# Patient Record
Sex: FEMALE | Race: BLACK OR AFRICAN AMERICAN
[De-identification: names, ages, dates, MRNs, and addresses within clinical notes are randomized per-mention and may not be internally consistent; named-entity substitution may affect disease eponyms.]

---

## 2020-12-20 ENCOUNTER — ERX REFILL RESPONSE (OUTPATIENT)
Age: 85
End: 2020-12-20

## 2020-12-20 RX ORDER — PANTOPRAZOLE SODIUM 40 MG/1
1 TABLET TABLET, DELAYED RELEASE ORAL ONCE A DAY
Qty: 90 TABLET | Refills: 2

## 2022-09-13 ENCOUNTER — LAB OUTSIDE AN ENCOUNTER (OUTPATIENT)
Dept: URBAN - METROPOLITAN AREA CLINIC 40 | Facility: CLINIC | Age: 87
End: 2022-09-13

## 2022-09-13 ENCOUNTER — WEB ENCOUNTER (OUTPATIENT)
Dept: URBAN - METROPOLITAN AREA CLINIC 40 | Facility: CLINIC | Age: 87
End: 2022-09-13

## 2022-09-13 ENCOUNTER — OFFICE VISIT (OUTPATIENT)
Dept: URBAN - METROPOLITAN AREA CLINIC 40 | Facility: CLINIC | Age: 87
End: 2022-09-13
Payer: COMMERCIAL

## 2022-09-13 VITALS
HEIGHT: 60 IN | WEIGHT: 139 LBS | TEMPERATURE: 97.5 F | DIASTOLIC BLOOD PRESSURE: 80 MMHG | BODY MASS INDEX: 27.29 KG/M2 | HEART RATE: 68 BPM | SYSTOLIC BLOOD PRESSURE: 132 MMHG

## 2022-09-13 DIAGNOSIS — K59.04 CHRONIC IDIOPATHIC CONSTIPATION: ICD-10-CM

## 2022-09-13 DIAGNOSIS — R10.84 GENERALIZED ABDOMINAL PAIN: ICD-10-CM

## 2022-09-13 DIAGNOSIS — R14.0 BLOATING: ICD-10-CM

## 2022-09-13 DIAGNOSIS — K22.2 SCHATZKI'S RING: ICD-10-CM

## 2022-09-13 PROCEDURE — 99204 OFFICE O/P NEW MOD 45 MIN: CPT | Performed by: INTERNAL MEDICINE

## 2022-09-13 RX ORDER — HYDROCHLOROTHIAZIDE 25 MG/1
AS DIRECTED TABLET ORAL
Status: ACTIVE | COMMUNITY

## 2022-09-13 RX ORDER — LINACLOTIDE 145 UG/1
1 CAPSULE AT LEAST 30 MINUTES BEFORE THE FIRST MEAL OF THE DAY ON AN EMPTY STOMACH CAPSULE, GELATIN COATED ORAL ONCE A DAY
Qty: 30 TABLET | Refills: 3 | OUTPATIENT
Start: 2022-09-13 | End: 2023-01-10

## 2022-09-13 RX ORDER — ROSUVASTATIN CALCIUM 10 MG/1
AS DIRECTED TABLET, FILM COATED ORAL
Status: ACTIVE | COMMUNITY

## 2022-09-13 RX ORDER — NEBIVOLOL 10 MG/1
AS DIRECTED TABLET ORAL
Status: ACTIVE | COMMUNITY

## 2022-09-13 RX ORDER — LISINOPRIL 5 MG/1
AS DIRECTED TABLET ORAL
Status: ACTIVE | COMMUNITY

## 2022-09-13 RX ORDER — PANTOPRAZOLE SODIUM 40 MG/1
1 TABLET TABLET, DELAYED RELEASE ORAL ONCE A DAY
Qty: 90 TABLET | Refills: 2 | Status: DISCONTINUED | COMMUNITY

## 2022-09-13 NOTE — HPI-TODAY'S VISIT:
87 yo CF here with c/o abdominal bloating/cramping. Has chronic constipation. uptodate on colonoscopy. Fairly good health for her age. h/o GERD/schatzki's ring. no dysphagia now

## 2022-09-13 NOTE — PHYSICAL EXAM GASTROINTESTINAL
Abdomen , soft, TTP diffusely nondistended , no guarding or rigidity , no masses palpable , normal bowel sounds , Liver and Spleen , no hepatomegaly present , no hepatosplenomegaly , liver nontender , spleen not palpable

## 2022-09-16 ENCOUNTER — OFFICE VISIT (OUTPATIENT)
Dept: URBAN - METROPOLITAN AREA CLINIC 39 | Facility: CLINIC | Age: 87
End: 2022-09-16

## 2022-09-30 ENCOUNTER — CLAIMS CREATED FROM THE CLAIM WINDOW (OUTPATIENT)
Dept: URBAN - METROPOLITAN AREA CLINIC 39 | Facility: CLINIC | Age: 87
End: 2022-09-30

## 2022-09-30 ENCOUNTER — CLAIMS CREATED FROM THE CLAIM WINDOW (OUTPATIENT)
Dept: URBAN - METROPOLITAN AREA CLINIC 39 | Facility: CLINIC | Age: 87
End: 2022-09-30
Payer: COMMERCIAL

## 2022-09-30 ENCOUNTER — OFFICE VISIT (OUTPATIENT)
Dept: URBAN - METROPOLITAN AREA CLINIC 39 | Facility: CLINIC | Age: 87
End: 2022-09-30

## 2022-09-30 DIAGNOSIS — R14.0 BLOATING: ICD-10-CM

## 2022-09-30 DIAGNOSIS — I70.0 ABDOMINAL AORTIC ATHEROSCLEROSIS: ICD-10-CM

## 2022-09-30 PROCEDURE — 76700 US EXAM ABDOM COMPLETE: CPT | Performed by: INTERNAL MEDICINE

## 2022-09-30 RX ORDER — LISINOPRIL 5 MG/1
AS DIRECTED TABLET ORAL
Status: ACTIVE | COMMUNITY

## 2022-09-30 RX ORDER — NEBIVOLOL 10 MG/1
AS DIRECTED TABLET ORAL
Status: ACTIVE | COMMUNITY

## 2022-09-30 RX ORDER — ROSUVASTATIN CALCIUM 10 MG/1
AS DIRECTED TABLET, FILM COATED ORAL
Status: ACTIVE | COMMUNITY

## 2022-09-30 RX ORDER — HYDROCHLOROTHIAZIDE 25 MG/1
AS DIRECTED TABLET ORAL
Status: ACTIVE | COMMUNITY

## 2022-09-30 RX ORDER — LINACLOTIDE 145 UG/1
1 CAPSULE AT LEAST 30 MINUTES BEFORE THE FIRST MEAL OF THE DAY ON AN EMPTY STOMACH CAPSULE, GELATIN COATED ORAL ONCE A DAY
Qty: 30 TABLET | Refills: 3 | Status: ACTIVE | COMMUNITY
Start: 2022-09-13 | End: 2023-01-10

## 2022-10-11 PROBLEM — 233955003 ABDOMINAL AORTIC ATHEROSCLEROSIS: Status: ACTIVE | Noted: 2022-10-11

## 2022-10-14 ENCOUNTER — TELEPHONE ENCOUNTER (OUTPATIENT)
Dept: URBAN - METROPOLITAN AREA CLINIC 40 | Facility: CLINIC | Age: 87
End: 2022-10-14

## 2022-10-14 ENCOUNTER — WEB ENCOUNTER (OUTPATIENT)
Dept: URBAN - METROPOLITAN AREA CLINIC 40 | Facility: CLINIC | Age: 87
End: 2022-10-14

## 2022-10-14 ENCOUNTER — OFFICE VISIT (OUTPATIENT)
Dept: URBAN - METROPOLITAN AREA CLINIC 40 | Facility: CLINIC | Age: 87
End: 2022-10-14
Payer: COMMERCIAL

## 2022-10-14 VITALS
HEIGHT: 60 IN | WEIGHT: 139 LBS | HEART RATE: 66 BPM | SYSTOLIC BLOOD PRESSURE: 150 MMHG | TEMPERATURE: 97.5 F | BODY MASS INDEX: 27.29 KG/M2 | DIASTOLIC BLOOD PRESSURE: 84 MMHG

## 2022-10-14 DIAGNOSIS — K22.2 SCHATZKI'S RING: ICD-10-CM

## 2022-10-14 DIAGNOSIS — R10.84 GENERALIZED ABDOMINAL PAIN: ICD-10-CM

## 2022-10-14 DIAGNOSIS — R14.0 BLOATING: ICD-10-CM

## 2022-10-14 DIAGNOSIS — K59.09 CHANGE IN BOWEL MOVEMENTS INTERMITTENT CONSTIPATION. URGENCY IN THE MORNING.: ICD-10-CM

## 2022-10-14 PROCEDURE — 99213 OFFICE O/P EST LOW 20 MIN: CPT | Performed by: PHYSICIAN ASSISTANT

## 2022-10-14 RX ORDER — LINACLOTIDE 145 UG/1
1 CAPSULE AT LEAST 30 MINUTES BEFORE THE FIRST MEAL OF THE DAY ON AN EMPTY STOMACH CAPSULE, GELATIN COATED ORAL ONCE A DAY
OUTPATIENT
Start: 2022-09-13

## 2022-10-14 RX ORDER — LISINOPRIL 5 MG/1
AS DIRECTED TABLET ORAL
Status: ACTIVE | COMMUNITY

## 2022-10-14 RX ORDER — ROSUVASTATIN CALCIUM 10 MG/1
AS DIRECTED TABLET, FILM COATED ORAL
Status: ACTIVE | COMMUNITY

## 2022-10-14 RX ORDER — HYDROCHLOROTHIAZIDE 25 MG/1
AS DIRECTED TABLET ORAL
Status: ACTIVE | COMMUNITY

## 2022-10-14 RX ORDER — NEBIVOLOL 10 MG/1
AS DIRECTED TABLET ORAL
Status: ACTIVE | COMMUNITY

## 2022-10-14 RX ORDER — LINACLOTIDE 145 UG/1
1 CAPSULE AT LEAST 30 MINUTES BEFORE THE FIRST MEAL OF THE DAY ON AN EMPTY STOMACH CAPSULE, GELATIN COATED ORAL ONCE A DAY
Qty: 30 TABLET | Refills: 3 | Status: ACTIVE | COMMUNITY
Start: 2022-09-13 | End: 2023-01-10

## 2022-10-14 NOTE — HPI-TODAY'S VISIT:
Her recent complete abdominal ultrasound notable for abdominal aortic plaque, mild.  No evidence of aneurysm.  She admits that her abdominal discomfort is overall improved and has tried to pay attention to her diet and foods that may trigger her symptoms of discomfort, bloating and constipation.  She has noted that bananas and certain beans cause her a problem but she is able to tolerate most fruits and other green vegetables.  No complaint of rectal bleeding, melena.  No nausea, vomiting or dysphagia at this time.  Good appetite.

## 2022-10-14 NOTE — HPI-TODAY'S VISIT:
87 yo CF here with c/o abdominal bloating/cramping. Has chronic constipation. uptodate on colonoscopy. Fairly good health for her age. h/o GERD/schatzki's ring.

## 2023-02-14 ENCOUNTER — WEB ENCOUNTER (OUTPATIENT)
Dept: URBAN - METROPOLITAN AREA CLINIC 40 | Facility: CLINIC | Age: 88
End: 2023-02-14

## 2023-02-17 ENCOUNTER — OFFICE VISIT (OUTPATIENT)
Dept: URBAN - METROPOLITAN AREA CLINIC 40 | Facility: CLINIC | Age: 88
End: 2023-02-17

## 2023-02-17 ENCOUNTER — OFFICE VISIT (OUTPATIENT)
Dept: URBAN - METROPOLITAN AREA CLINIC 40 | Facility: CLINIC | Age: 88
End: 2023-02-17
Payer: COMMERCIAL

## 2023-02-17 VITALS
HEART RATE: 73 BPM | SYSTOLIC BLOOD PRESSURE: 160 MMHG | WEIGHT: 133 LBS | HEIGHT: 60 IN | DIASTOLIC BLOOD PRESSURE: 74 MMHG | BODY MASS INDEX: 26.11 KG/M2

## 2023-02-17 DIAGNOSIS — K59.04 CHRONIC IDIOPATHIC CONSTIPATION: ICD-10-CM

## 2023-02-17 DIAGNOSIS — K22.2 SCHATZKI'S RING: ICD-10-CM

## 2023-02-17 DIAGNOSIS — R10.84 GENERALIZED ABDOMINAL PAIN: ICD-10-CM

## 2023-02-17 PROBLEM — 116289008: Status: ACTIVE | Noted: 2022-09-13

## 2023-02-17 PROBLEM — 235623002: Status: ACTIVE | Noted: 2022-09-13

## 2023-02-17 PROBLEM — 102614006: Status: ACTIVE | Noted: 2022-09-13

## 2023-02-17 PROBLEM — 82934008: Status: ACTIVE | Noted: 2022-09-13

## 2023-02-17 PROCEDURE — 99213 OFFICE O/P EST LOW 20 MIN: CPT | Performed by: PHYSICIAN ASSISTANT

## 2023-02-17 RX ORDER — HYDROCHLOROTHIAZIDE 25 MG/1
AS DIRECTED TABLET ORAL
Status: ACTIVE | COMMUNITY

## 2023-02-17 RX ORDER — LINACLOTIDE 145 UG/1
1 CAPSULE AT LEAST 30 MINUTES BEFORE THE FIRST MEAL OF THE DAY ON AN EMPTY STOMACH CAPSULE, GELATIN COATED ORAL ONCE A DAY
Status: ACTIVE | COMMUNITY
Start: 2022-09-13

## 2023-02-17 RX ORDER — LISINOPRIL 30 MG/1
1 TABLET TABLET ORAL ONCE A DAY
Status: ACTIVE | COMMUNITY

## 2023-02-17 RX ORDER — LINACLOTIDE 145 UG/1
1 CAPSULE AT LEAST 30 MINUTES BEFORE THE FIRST MEAL OF THE DAY ON AN EMPTY STOMACH CAPSULE, GELATIN COATED ORAL ONCE A DAY
Qty: 30 CAPSULE | Refills: 5 | OUTPATIENT

## 2023-02-17 RX ORDER — NEBIVOLOL 10 MG/1
AS DIRECTED TABLET ORAL
Status: ACTIVE | COMMUNITY

## 2023-02-17 RX ORDER — ROSUVASTATIN CALCIUM 10 MG/1
AS DIRECTED TABLET, FILM COATED ORAL
Status: ACTIVE | COMMUNITY

## 2023-02-17 NOTE — HPI-TODAY'S VISIT:
Ms. Anderson is a 87 year old female with c/o abdominal bloating/cramping. Has chronic constipation. uptodate on colonoscopy with normal exam in 2019. Fairly good health for her age. h/o GERD/schatzki's ring found on EGD in 2017. Her recent complete abdominal ultrasound notable for abdominal aortic plaque, mild.  No evidence of aneurysm.  She admits that her abdominal discomfort is overall improved and has tried to pay attention to her diet and foods that may trigger her symptoms of discomfort, bloating and constipation.  She has noted that bananas and certain beans cause her a problem but she is able to tolerate most fruits and other green vegetables.  No complaint of rectal bleeding, melena.  No nausea, vomiting or dysphagia at this time.  Good appetite. She admits to doing well from a gastrointestinal standpoint, requesting refills on her Linzess 145 mcg which she now takes every other day.  She is eating more fiber and drinking more water and believes this helps.  Denies any rectal bleeding.  She did follow-up with cardiologist who is watching her diastolic blood pressure but states that she is otherwise doing well from a cardiovascular standpoint.

## 2023-08-17 ENCOUNTER — OFFICE VISIT (OUTPATIENT)
Dept: URBAN - METROPOLITAN AREA CLINIC 40 | Facility: CLINIC | Age: 88
End: 2023-08-17

## 2023-10-12 ENCOUNTER — OFFICE VISIT (OUTPATIENT)
Dept: URBAN - METROPOLITAN AREA CLINIC 40 | Facility: CLINIC | Age: 88
End: 2023-10-12
Payer: COMMERCIAL

## 2023-10-12 VITALS
HEIGHT: 60 IN | SYSTOLIC BLOOD PRESSURE: 174 MMHG | TEMPERATURE: 99 F | BODY MASS INDEX: 26.7 KG/M2 | DIASTOLIC BLOOD PRESSURE: 82 MMHG | WEIGHT: 136 LBS | HEART RATE: 75 BPM

## 2023-10-12 DIAGNOSIS — K30 INDIGESTION: ICD-10-CM

## 2023-10-12 DIAGNOSIS — K22.2 SCHATZKI'S RING: ICD-10-CM

## 2023-10-12 DIAGNOSIS — R07.9 CHEST PAIN AT REST: ICD-10-CM

## 2023-10-12 DIAGNOSIS — R10.9 CHRONIC ABDOMINAL PAIN: ICD-10-CM

## 2023-10-12 PROCEDURE — 99214 OFFICE O/P EST MOD 30 MIN: CPT | Performed by: PHYSICIAN ASSISTANT

## 2023-10-12 RX ORDER — LINACLOTIDE 145 UG/1
1 CAPSULE AT LEAST 30 MINUTES BEFORE THE FIRST MEAL OF THE DAY ON AN EMPTY STOMACH CAPSULE, GELATIN COATED ORAL ONCE A DAY
Qty: 30 CAPSULE | Refills: 5 | Status: ACTIVE | COMMUNITY

## 2023-10-12 RX ORDER — HYDROCHLOROTHIAZIDE 25 MG/1
AS DIRECTED TABLET ORAL
Status: ACTIVE | COMMUNITY

## 2023-10-12 RX ORDER — FAMOTIDINE 40 MG/1
1 TABLET AT BEDTIME TABLET, FILM COATED ORAL ONCE A DAY
Qty: 30 TABLET | Refills: 1 | OUTPATIENT
Start: 2023-10-12

## 2023-10-12 RX ORDER — LISINOPRIL 30 MG/1
1 TABLET TABLET ORAL ONCE A DAY
Status: ACTIVE | COMMUNITY

## 2023-10-12 RX ORDER — ESCITALOPRAM OXALATE 5 MG/1
1 TABLET TABLET ORAL ONCE A DAY
Status: ACTIVE | COMMUNITY

## 2023-10-12 RX ORDER — ROSUVASTATIN CALCIUM 10 MG/1
AS DIRECTED TABLET, FILM COATED ORAL
Status: ACTIVE | COMMUNITY

## 2023-10-12 NOTE — HPI-TODAY'S VISIT:
Ms. Anderson is a 87 year old female , with history of abdominal bloating/cramping. Has chronic constipation. Last seen 2/17/23, and  uptodate on colonoscopy with normal exam in 2019. Fairly good health for her age. h/o GERD/schatzki's ring found on EGD in 2017. Her recent complete abdominal ultrasound notable for abdominal aortic plaque, mild.  No evidence of aneurysm.  She admits that her abdominal discomfort is overall improved and has tried to pay attention to her diet and foods that may trigger her symptoms of discomfort, bloating and constipation.  She has noted that bananas and certain beans cause her a problem but she is able to tolerate most fruits and other green vegetables.  No complaint of rectal bleeding, melena.  No nausea, vomiting or dysphagia at this time.  Good appetite. She returns to the office today reporting that for 4 months, she has had right and left chest discomfort which is nonradiating, intermittent. No current shortness of breath. Denies orthopnea. States that she has no nausea, vomiting or dysphagia but some indigestion primarily at nighttime. She is not taking anything for this. She now believes even every other day dosing of Linzess 145 mcg is too strong. Otherwise bowel habits unchanged, no rectal bleeding. Denies any changes in medications. States that recently, then later states about 2 months ago, she had presyncopal episode as well as some dizziness, a fall at home when her right knee buckled trying to get up out of bed and rush. No falls in the last several weeks. Apparently, she has been seen by [facility] cardiology in the past but states that she does not recall seeing a cardiovascular provider. She also tells me that she recently told her primary medical provider that she had chest discomfort but the "tests were all normal". Family with patient during visit today. They seem to believe that the patient is only recently started having the symptoms in the last week or so. Patient however, maintains that she has had symptoms ongoing for at least 2 to 4 months. Appetite preserved and weight fairly stable.

## 2023-11-30 ENCOUNTER — DASHBOARD ENCOUNTERS (OUTPATIENT)
Age: 88
End: 2023-11-30

## 2023-11-30 ENCOUNTER — OFFICE VISIT (OUTPATIENT)
Dept: URBAN - METROPOLITAN AREA CLINIC 40 | Facility: CLINIC | Age: 88
End: 2023-11-30
Payer: COMMERCIAL

## 2023-11-30 VITALS
HEART RATE: 62 BPM | DIASTOLIC BLOOD PRESSURE: 88 MMHG | WEIGHT: 127 LBS | BODY MASS INDEX: 24.94 KG/M2 | TEMPERATURE: 97.3 F | SYSTOLIC BLOOD PRESSURE: 152 MMHG | HEIGHT: 60 IN

## 2023-11-30 DIAGNOSIS — K22.2 SCHATZKI'S RING: ICD-10-CM

## 2023-11-30 DIAGNOSIS — K59.04 CHRONIC IDIOPATHIC CONSTIPATION: ICD-10-CM

## 2023-11-30 DIAGNOSIS — R10.9 CHRONIC ABDOMINAL PAIN: ICD-10-CM

## 2023-11-30 DIAGNOSIS — K59.01 CONSTIPATION: ICD-10-CM

## 2023-11-30 DIAGNOSIS — R41.3 MEMORY CHANGES: ICD-10-CM

## 2023-11-30 DIAGNOSIS — K30 INDIGESTION: ICD-10-CM

## 2023-11-30 PROBLEM — 386807006: Status: ACTIVE | Noted: 2023-11-30

## 2023-11-30 PROCEDURE — 99214 OFFICE O/P EST MOD 30 MIN: CPT | Performed by: PHYSICIAN ASSISTANT

## 2023-11-30 RX ORDER — FAMOTIDINE 40 MG/1
1 TABLET AT BEDTIME TABLET, FILM COATED ORAL ONCE A DAY
Qty: 30 TABLET | Refills: 5
Start: 2023-10-12

## 2023-11-30 RX ORDER — ROSUVASTATIN CALCIUM 10 MG/1
AS DIRECTED TABLET, FILM COATED ORAL
Status: ACTIVE | COMMUNITY

## 2023-11-30 RX ORDER — FAMOTIDINE 40 MG/1
1 TABLET AT BEDTIME TABLET, FILM COATED ORAL ONCE A DAY
Qty: 30 TABLET | Refills: 1 | Status: ACTIVE | COMMUNITY
Start: 2023-10-12

## 2023-11-30 RX ORDER — LINACLOTIDE 145 UG/1
1 CAPSULE AT LEAST 30 MINUTES BEFORE THE FIRST MEAL OF THE DAY ON AN EMPTY STOMACH CAPSULE, GELATIN COATED ORAL ONCE A DAY
Qty: 30 CAPSULE | Refills: 5 | Status: ACTIVE | COMMUNITY

## 2023-11-30 RX ORDER — HYDROCHLOROTHIAZIDE 25 MG/1
AS DIRECTED TABLET ORAL
Status: ACTIVE | COMMUNITY

## 2023-11-30 RX ORDER — LISINOPRIL 30 MG/1
1 TABLET TABLET ORAL ONCE A DAY
Status: ACTIVE | COMMUNITY

## 2023-11-30 RX ORDER — ESCITALOPRAM OXALATE 5 MG/1
1 TABLET TABLET ORAL ONCE A DAY
Status: ACTIVE | COMMUNITY

## 2023-11-30 RX ORDER — LINACLOTIDE 72 UG/1
1 CAPSULE AT LEAST 30 MINUTES BEFORE THE FIRST MEAL OF THE DAY ON AN EMPTY STOMACH CAPSULE, GELATIN COATED ORAL ONCE A DAY
Qty: 30 CAPSULE | Refills: 5
End: 2024-05-28

## 2023-11-30 NOTE — HPI-TODAY'S VISIT:
Ms. Anderson is a 87 year old female , with history of abdominal bloating/cramping. Has chronic constipation. Last seen 10/12/23, and  uptodate on colonoscopy with normal exam in 2019. Fairly good health for her age with some memory loss. She has h/o GERD/schatzki's ring found on EGD in 2017. Her recent complete abdominal ultrasound notable for abdominal aortic plaque, mild.  No evidence of aneurysm.  She admits that her abdominal discomfort is overall improved and has tried to pay attention to her diet and foods that may trigger her symptoms of discomfort, bloating and constipation.  She has noted that bananas and certain beans cause her a problem but she is able to tolerate most fruits and other green vegetables.  No complaint of rectal bleeding, melena.  No nausea, vomiting or dysphagia at this time.  Good appetite. States that she has no nausea, vomiting or dysphagia but some indigestion primarily at nighttime. Chest discomfort improved, falling prior fall. Evaluated by CVM. Appetite preserved and weight fairly stable. She is feeling okay today.  States that her chest pain has improved.  She was seen by cardiovascular medicine who did believe that her pain was musculoskeletal in origin though she did have hypertension.  She remains hypertensive and did have hypertensive urgency recently on the same day as her cardiovascular visit.  She was seen in ER and diagnosed with UTI, given antibiotics to treat this.  It was believed that the pain was causing her hypertension.  She has no complaints today of nausea, vomiting, fever or chills.  Good bowel habits on the Linzess but is concerned about taking this long-term and concerned about potential for diarrhea.  She does like the low-dose of 72 mcg.  She is also wondering if she should continue Pepcid which seems to help with heartburn.  She is taking this at night.

## 2024-05-21 ENCOUNTER — OFFICE VISIT (OUTPATIENT)
Dept: URBAN - METROPOLITAN AREA CLINIC 40 | Facility: CLINIC | Age: 89
End: 2024-05-21

## 2024-06-11 ENCOUNTER — OFFICE VISIT (OUTPATIENT)
Dept: URBAN - METROPOLITAN AREA CLINIC 40 | Facility: CLINIC | Age: 89
End: 2024-06-11
Payer: MEDICARE

## 2024-06-11 VITALS
HEIGHT: 60 IN | HEART RATE: 69 BPM | TEMPERATURE: 98 F | BODY MASS INDEX: 26.31 KG/M2 | WEIGHT: 134 LBS | DIASTOLIC BLOOD PRESSURE: 72 MMHG | SYSTOLIC BLOOD PRESSURE: 150 MMHG

## 2024-06-11 DIAGNOSIS — K22.2 SCHATZKI'S RING: ICD-10-CM

## 2024-06-11 DIAGNOSIS — K59.04 CHRONIC IDIOPATHIC CONSTIPATION: ICD-10-CM

## 2024-06-11 DIAGNOSIS — R41.3 MEMORY CHANGES: ICD-10-CM

## 2024-06-11 DIAGNOSIS — K29.60 ADENOPAPILLOMATOSIS GASTRICA: ICD-10-CM

## 2024-06-11 PROCEDURE — 99214 OFFICE O/P EST MOD 30 MIN: CPT | Performed by: PHYSICIAN ASSISTANT

## 2024-06-11 RX ORDER — HYDROCHLOROTHIAZIDE 25 MG/1
AS DIRECTED TABLET ORAL
Status: ACTIVE | COMMUNITY

## 2024-06-11 RX ORDER — LISINOPRIL 30 MG/1
1 TABLET TABLET ORAL ONCE A DAY
Status: ACTIVE | COMMUNITY

## 2024-06-11 RX ORDER — FAMOTIDINE 40 MG/1
1 TABLET AT BEDTIME TABLET, FILM COATED ORAL ONCE A DAY
Qty: 30 TABLET | Refills: 5
Start: 2023-10-12

## 2024-06-11 RX ORDER — ROSUVASTATIN 10 MG/1
AS DIRECTED TABLET, FILM COATED ORAL
Status: ACTIVE | COMMUNITY

## 2024-06-11 RX ORDER — FAMOTIDINE 40 MG/1
1 TABLET AT BEDTIME TABLET, FILM COATED ORAL ONCE A DAY
Qty: 30 TABLET | Refills: 5 | Status: ON HOLD | COMMUNITY
Start: 2023-10-12

## 2024-06-11 RX ORDER — ESCITALOPRAM OXALATE 5 MG/1
1 TABLET TABLET ORAL ONCE A DAY
Status: ACTIVE | COMMUNITY

## 2024-06-11 NOTE — HPI-TODAY'S VISIT:
Ms. Anderson is a 88 year black female with a long history of abdominal bloating/cramping, chronic constipation. Last seen 11/30/23, and  uptodate on colonoscopy with normal exam in 2019. Fairly good health for her age with some memory loss. She has h/o GERD/schatzki's ring found on EGD in 2017. Her recent complete abdominal ultrasound notable for abdominal aortic plaque, mild.  No evidence of aneurysm. States that her chest pain has improved.  She was seen by cardiovascular medicine who did believe that her pain was musculoskeletal in origin though she did have hypertension. Patient has been diagnosed with vascular dementia, her daughter stating this was diagnosed as a result of her prior issues with hypertension.  No complaint of nausea, vomiting or dysphagia.  Does admit to some plaques on bilateral buccal cheeks, unsure how long this has been occurring.  Admits to eating on sweets and cookies right before bedtime.  Occasional epigastric discomfort is wondering if she can resume famotidine.  No change in bowel habits, rectal bleeding or melena.  No use of NSAIDs.  Not requiring Linzess at this time for constipation but wondering if she may take some other medication as needed.

## 2024-06-11 NOTE — PHYSICAL EXAM CHEST:
chest wall non-tender, breathing is unlabored without accessory muscle use, normal breath sounds Good

## 2024-09-11 ENCOUNTER — OFFICE VISIT (OUTPATIENT)
Dept: URBAN - METROPOLITAN AREA CLINIC 40 | Facility: CLINIC | Age: 89
End: 2024-09-11

## 2024-09-11 RX ORDER — HYDROCHLOROTHIAZIDE 25 MG/1
AS DIRECTED TABLET ORAL
Status: ACTIVE | COMMUNITY

## 2024-09-11 RX ORDER — FAMOTIDINE 40 MG/1
1 TABLET AT BEDTIME TABLET, FILM COATED ORAL ONCE A DAY
Qty: 30 TABLET | Refills: 5 | Status: ACTIVE | COMMUNITY
Start: 2023-10-12

## 2024-09-11 RX ORDER — LISINOPRIL 30 MG/1
1 TABLET TABLET ORAL ONCE A DAY
Status: ACTIVE | COMMUNITY

## 2024-09-11 RX ORDER — ROSUVASTATIN 10 MG/1
AS DIRECTED TABLET, FILM COATED ORAL
Status: ACTIVE | COMMUNITY

## 2024-09-11 RX ORDER — ESCITALOPRAM OXALATE 5 MG/1
1 TABLET TABLET ORAL ONCE A DAY
Status: ACTIVE | COMMUNITY

## 2024-10-28 ENCOUNTER — OFFICE VISIT (OUTPATIENT)
Dept: URBAN - METROPOLITAN AREA CLINIC 40 | Facility: CLINIC | Age: 89
End: 2024-10-28

## 2024-10-28 RX ORDER — ROSUVASTATIN 10 MG/1
AS DIRECTED TABLET, FILM COATED ORAL
Status: ACTIVE | COMMUNITY

## 2024-10-28 RX ORDER — HYDROCHLOROTHIAZIDE 25 MG/1
AS DIRECTED TABLET ORAL
Status: ACTIVE | COMMUNITY

## 2024-10-28 RX ORDER — ESCITALOPRAM OXALATE 5 MG/1
1 TABLET TABLET ORAL ONCE A DAY
Status: ACTIVE | COMMUNITY

## 2024-10-28 RX ORDER — LISINOPRIL 30 MG/1
1 TABLET TABLET ORAL ONCE A DAY
Status: ACTIVE | COMMUNITY

## 2024-10-28 RX ORDER — FAMOTIDINE 40 MG/1
1 TABLET AT BEDTIME TABLET, FILM COATED ORAL ONCE A DAY
Qty: 30 TABLET | Refills: 5 | Status: ACTIVE | COMMUNITY
Start: 2023-10-12

## 2024-11-26 ENCOUNTER — OFFICE VISIT (OUTPATIENT)
Dept: URBAN - METROPOLITAN AREA CLINIC 40 | Facility: CLINIC | Age: 89
End: 2024-11-26

## 2024-11-26 VITALS
DIASTOLIC BLOOD PRESSURE: 68 MMHG | HEART RATE: 73 BPM | BODY MASS INDEX: 26.9 KG/M2 | WEIGHT: 137 LBS | TEMPERATURE: 98 F | HEIGHT: 60 IN | OXYGEN SATURATION: 97 % | SYSTOLIC BLOOD PRESSURE: 112 MMHG

## 2024-11-26 RX ORDER — LACTULOSE 10 G/15ML
15 MILLILITER SOLUTION ORAL TWICE DAILY
Qty: 900 MILLILITER | Refills: 3 | OUTPATIENT
Start: 2024-11-26 | End: 2025-03-26

## 2024-11-26 RX ORDER — PANTOPRAZOLE SODIUM 40 MG/1
1 TABLET TABLET, DELAYED RELEASE ORAL ONCE A DAY
Qty: 90 TABLET | Refills: 3 | OUTPATIENT
Start: 2024-11-26

## 2024-11-26 RX ORDER — FAMOTIDINE 40 MG/1
1 TABLET AT BEDTIME TABLET, FILM COATED ORAL ONCE A DAY
Qty: 30 TABLET | Refills: 5 | Status: ON HOLD | COMMUNITY
Start: 2023-10-12

## 2024-11-26 RX ORDER — ESCITALOPRAM OXALATE 5 MG/1
1 TABLET TABLET ORAL ONCE A DAY
Status: ACTIVE | COMMUNITY

## 2024-11-26 RX ORDER — ROSUVASTATIN 10 MG/1
AS DIRECTED TABLET, FILM COATED ORAL
Status: ACTIVE | COMMUNITY

## 2024-11-26 RX ORDER — HYDROCHLOROTHIAZIDE 25 MG/1
AS DIRECTED TABLET ORAL
Status: ACTIVE | COMMUNITY

## 2024-11-26 RX ORDER — LISINOPRIL 30 MG/1
1 TABLET TABLET ORAL ONCE A DAY
Status: ACTIVE | COMMUNITY

## 2025-01-29 ENCOUNTER — TELEPHONE ENCOUNTER (OUTPATIENT)
Dept: URBAN - METROPOLITAN AREA CLINIC 40 | Facility: CLINIC | Age: OVER 89
End: 2025-01-29

## 2025-01-31 ENCOUNTER — ERX REFILL RESPONSE (OUTPATIENT)
Dept: URBAN - METROPOLITAN AREA CLINIC 40 | Facility: CLINIC | Age: OVER 89
End: 2025-01-31

## 2025-01-31 RX ORDER — LACTULOSE 10 G/15ML
TAKE 15 ML BY MOUTH TWICE DAILY SOLUTION ORAL
Qty: 2838 MILLILITER | Refills: 2 | OUTPATIENT

## 2025-01-31 RX ORDER — LACTULOSE 10 G/15ML
15 MILLILITER SOLUTION ORAL TWICE DAILY
Qty: 900 MILLILITER | Refills: 3 | OUTPATIENT

## 2025-02-14 ENCOUNTER — OFFICE VISIT (OUTPATIENT)
Dept: URBAN - METROPOLITAN AREA CLINIC 40 | Facility: CLINIC | Age: OVER 89
End: 2025-02-14

## 2025-02-14 RX ORDER — HYDROCHLOROTHIAZIDE 25 MG/1
AS DIRECTED TABLET ORAL
Status: ACTIVE | COMMUNITY

## 2025-02-14 RX ORDER — PANTOPRAZOLE SODIUM 40 MG/1
1 TABLET TABLET, DELAYED RELEASE ORAL ONCE A DAY
Qty: 90 TABLET | Refills: 3 | Status: ACTIVE | COMMUNITY
Start: 2024-11-26

## 2025-02-14 RX ORDER — ESCITALOPRAM OXALATE 5 MG/1
1 TABLET TABLET ORAL ONCE A DAY
Status: ACTIVE | COMMUNITY

## 2025-02-14 RX ORDER — ROSUVASTATIN 10 MG/1
AS DIRECTED TABLET, FILM COATED ORAL
Status: ACTIVE | COMMUNITY

## 2025-02-14 RX ORDER — LISINOPRIL 30 MG/1
1 TABLET TABLET ORAL ONCE A DAY
Status: ACTIVE | COMMUNITY

## 2025-02-14 RX ORDER — FAMOTIDINE 40 MG/1
1 TABLET AT BEDTIME TABLET, FILM COATED ORAL ONCE A DAY
Qty: 30 TABLET | Refills: 5 | Status: ON HOLD | COMMUNITY
Start: 2023-10-12

## 2025-02-14 RX ORDER — LACTULOSE 10 G/15ML
TAKE 15 ML BY MOUTH TWICE DAILY SOLUTION ORAL
Qty: 2838 MILLILITER | Refills: 2 | Status: ACTIVE | COMMUNITY